# Patient Record
Sex: FEMALE | NOT HISPANIC OR LATINO | Employment: UNEMPLOYED | URBAN - NONMETROPOLITAN AREA
[De-identification: names, ages, dates, MRNs, and addresses within clinical notes are randomized per-mention and may not be internally consistent; named-entity substitution may affect disease eponyms.]

---

## 2024-02-29 ENCOUNTER — PREP FOR PROCEDURE (OUTPATIENT)
Dept: OPERATING ROOM | Facility: HOSPITAL | Age: 64
End: 2024-02-29
Payer: MEDICAID

## 2024-02-29 DIAGNOSIS — H25.812 COMBINED FORMS OF AGE-RELATED CATARACT OF LEFT EYE: Primary | ICD-10-CM

## 2024-02-29 RX ORDER — POVIDONE-IODINE 5 %
SOLUTION, NON-ORAL OPHTHALMIC (EYE) ONCE
Status: CANCELLED | OUTPATIENT
Start: 2024-03-07 | End: 2024-02-29

## 2024-02-29 RX ORDER — TETRACAINE HYDROCHLORIDE 5 MG/ML
1 SOLUTION OPHTHALMIC ONCE
Status: CANCELLED | OUTPATIENT
Start: 2024-03-07 | End: 2024-02-29

## 2024-02-29 RX ORDER — PREDNISOLONE ACETATE 10 MG/ML
1 SUSPENSION/ DROPS OPHTHALMIC ONCE
Status: CANCELLED | OUTPATIENT
Start: 2024-03-07 | End: 2024-02-29

## 2024-02-29 RX ORDER — KETOROLAC TROMETHAMINE 5 MG/ML
1 SOLUTION OPHTHALMIC
Status: CANCELLED | OUTPATIENT
Start: 2024-03-07 | End: 2024-03-07

## 2024-03-05 RX ORDER — ASPIRIN 81 MG/1
81 TABLET ORAL DAILY
COMMUNITY

## 2024-03-05 RX ORDER — TELMISARTAN 40 MG/1
40 TABLET ORAL DAILY
COMMUNITY

## 2024-03-05 RX ORDER — METFORMIN HYDROCHLORIDE EXTENDED-RELEASE TABLETS 500 MG/1
500 TABLET, FILM COATED, EXTENDED RELEASE ORAL
COMMUNITY

## 2024-03-05 RX ORDER — LEVOTHYROXINE SODIUM 75 UG/1
75 TABLET ORAL
COMMUNITY

## 2024-03-05 RX ORDER — ATORVASTATIN CALCIUM 10 MG/1
10 TABLET, FILM COATED ORAL DAILY
COMMUNITY

## 2024-03-06 ENCOUNTER — ANESTHESIA EVENT (OUTPATIENT)
Dept: OPERATING ROOM | Facility: HOSPITAL | Age: 64
End: 2024-03-06
Payer: MEDICAID

## 2024-03-07 ENCOUNTER — ANESTHESIA (OUTPATIENT)
Dept: OPERATING ROOM | Facility: HOSPITAL | Age: 64
End: 2024-03-07
Payer: MEDICAID

## 2024-03-07 ENCOUNTER — HOSPITAL ENCOUNTER (OUTPATIENT)
Facility: HOSPITAL | Age: 64
Setting detail: OUTPATIENT SURGERY
Discharge: HOME | End: 2024-03-07
Attending: OPHTHALMOLOGY | Admitting: OPHTHALMOLOGY
Payer: MEDICAID

## 2024-03-07 VITALS
HEIGHT: 61 IN | TEMPERATURE: 97.9 F | RESPIRATION RATE: 18 BRPM | BODY MASS INDEX: 28.32 KG/M2 | HEART RATE: 64 BPM | DIASTOLIC BLOOD PRESSURE: 75 MMHG | SYSTOLIC BLOOD PRESSURE: 133 MMHG | WEIGHT: 150 LBS | OXYGEN SATURATION: 100 %

## 2024-03-07 LAB — GLUCOSE BLD MANUAL STRIP-MCNC: 107 MG/DL (ref 74–99)

## 2024-03-07 PROCEDURE — 2500000004 HC RX 250 GENERAL PHARMACY W/ HCPCS (ALT 636 FOR OP/ED): Performed by: ANESTHESIOLOGY

## 2024-03-07 PROCEDURE — 7100000010 HC PHASE TWO TIME - EACH INCREMENTAL 1 MINUTE: Performed by: OPHTHALMOLOGY

## 2024-03-07 PROCEDURE — 7100000009 HC PHASE TWO TIME - INITIAL BASE CHARGE: Performed by: OPHTHALMOLOGY

## 2024-03-07 PROCEDURE — 3700000001 HC GENERAL ANESTHESIA TIME - INITIAL BASE CHARGE: Performed by: OPHTHALMOLOGY

## 2024-03-07 PROCEDURE — 82947 ASSAY GLUCOSE BLOOD QUANT: CPT

## 2024-03-07 PROCEDURE — 3600000008 HC OR TIME - EACH INCREMENTAL 1 MINUTE - PROCEDURE LEVEL THREE: Performed by: OPHTHALMOLOGY

## 2024-03-07 PROCEDURE — 2760000001 HC OR 276 NO HCPCS: Performed by: OPHTHALMOLOGY

## 2024-03-07 PROCEDURE — 3700000002 HC GENERAL ANESTHESIA TIME - EACH INCREMENTAL 1 MINUTE: Performed by: OPHTHALMOLOGY

## 2024-03-07 PROCEDURE — 2500000001 HC RX 250 WO HCPCS SELF ADMINISTERED DRUGS (ALT 637 FOR MEDICARE OP): Performed by: OPHTHALMOLOGY

## 2024-03-07 PROCEDURE — 2720000007 HC OR 272 NO HCPCS: Performed by: OPHTHALMOLOGY

## 2024-03-07 PROCEDURE — C1780 LENS, INTRAOCULAR (NEW TECH): HCPCS | Performed by: OPHTHALMOLOGY

## 2024-03-07 PROCEDURE — 3600000003 HC OR TIME - INITIAL BASE CHARGE - PROCEDURE LEVEL THREE: Performed by: OPHTHALMOLOGY

## 2024-03-07 DEVICE — ACRYSOF(R) IQ ASPHERIC NATURAL IOL, SINGLE-PIECE ACRYLIC FOLDABLE PCL, UV WITH BLUE LIGHTFILTER, 13.0MM LENGTH, 6.0MM ANTERIORASYMMETRIC BICONVEX OPTIC, PLANAR HAPTICS.
Type: IMPLANTABLE DEVICE | Site: LENS | Status: FUNCTIONAL
Brand: ACRYSOF®

## 2024-03-07 RX ORDER — POVIDONE-IODINE 5 %
SOLUTION, NON-ORAL OPHTHALMIC (EYE) ONCE
Status: COMPLETED | OUTPATIENT
Start: 2024-03-07 | End: 2024-03-07

## 2024-03-07 RX ORDER — KETOROLAC TROMETHAMINE 5 MG/ML
1 SOLUTION OPHTHALMIC
Status: COMPLETED | OUTPATIENT
Start: 2024-03-07 | End: 2024-03-07

## 2024-03-07 RX ORDER — MIDAZOLAM HYDROCHLORIDE 1 MG/ML
2 INJECTION INTRAMUSCULAR; INTRAVENOUS ONCE AS NEEDED
Status: COMPLETED | OUTPATIENT
Start: 2024-03-07 | End: 2024-03-07

## 2024-03-07 RX ORDER — TETRACAINE HYDROCHLORIDE 5 MG/ML
1 SOLUTION OPHTHALMIC ONCE
Status: COMPLETED | OUTPATIENT
Start: 2024-03-07 | End: 2024-03-07

## 2024-03-07 RX ORDER — SODIUM CHLORIDE, SODIUM LACTATE, POTASSIUM CHLORIDE, CALCIUM CHLORIDE 600; 310; 30; 20 MG/100ML; MG/100ML; MG/100ML; MG/100ML
100 INJECTION, SOLUTION INTRAVENOUS CONTINUOUS
Status: DISCONTINUED | OUTPATIENT
Start: 2024-03-07 | End: 2024-03-07 | Stop reason: HOSPADM

## 2024-03-07 RX ORDER — PROPOFOL 10 MG/ML
INJECTION, EMULSION INTRAVENOUS AS NEEDED
Status: DISCONTINUED | OUTPATIENT
Start: 2024-03-07 | End: 2024-03-07

## 2024-03-07 RX ORDER — MIDAZOLAM HYDROCHLORIDE 1 MG/ML
INJECTION INTRAMUSCULAR; INTRAVENOUS AS NEEDED
Status: DISCONTINUED | OUTPATIENT
Start: 2024-03-07 | End: 2024-03-07

## 2024-03-07 RX ORDER — PREDNISOLONE ACETATE 10 MG/ML
1 SUSPENSION/ DROPS OPHTHALMIC ONCE
Status: DISCONTINUED | OUTPATIENT
Start: 2024-03-07 | End: 2024-03-07 | Stop reason: HOSPADM

## 2024-03-07 RX ADMIN — PROPOFOL 30 MG: 10 INJECTION, EMULSION INTRAVENOUS at 13:01

## 2024-03-07 RX ADMIN — MIDAZOLAM HYDROCHLORIDE 1 MG: 1 INJECTION, SOLUTION INTRAMUSCULAR; INTRAVENOUS at 13:01

## 2024-03-07 RX ADMIN — PHENYLEPHRINE HYDROCHLORIDE 1 DROP: 100 SOLUTION/ DROPS OPHTHALMIC at 12:13

## 2024-03-07 RX ADMIN — KETOROLAC TROMETHAMINE 1 DROP: 5 SOLUTION OPHTHALMIC at 12:13

## 2024-03-07 RX ADMIN — POVIDONE-IODINE: 5 SOLUTION OPHTHALMIC at 11:58

## 2024-03-07 RX ADMIN — MIDAZOLAM HYDROCHLORIDE 2 MG: 1 INJECTION, SOLUTION INTRAMUSCULAR; INTRAVENOUS at 12:38

## 2024-03-07 RX ADMIN — POLYVINYL ALCOHOL, POVIDONE 1 DROP: 14; 6 SOLUTION/ DROPS OPHTHALMIC at 11:59

## 2024-03-07 RX ADMIN — SODIUM CHLORIDE, POTASSIUM CHLORIDE, SODIUM LACTATE AND CALCIUM CHLORIDE 100 ML/HR: 600; 310; 30; 20 INJECTION, SOLUTION INTRAVENOUS at 12:06

## 2024-03-07 RX ADMIN — PHENYLEPHRINE HYDROCHLORIDE 1 DROP: 100 SOLUTION/ DROPS OPHTHALMIC at 12:25

## 2024-03-07 RX ADMIN — KETOROLAC TROMETHAMINE 1 DROP: 5 SOLUTION OPHTHALMIC at 12:05

## 2024-03-07 RX ADMIN — PHENYLEPHRINE HYDROCHLORIDE 1 DROP: 100 SOLUTION/ DROPS OPHTHALMIC at 11:59

## 2024-03-07 RX ADMIN — PHENYLEPHRINE HYDROCHLORIDE 1 DROP: 100 SOLUTION/ DROPS OPHTHALMIC at 12:05

## 2024-03-07 RX ADMIN — KETOROLAC TROMETHAMINE 1 DROP: 5 SOLUTION OPHTHALMIC at 12:25

## 2024-03-07 RX ADMIN — POLYVINYL ALCOHOL, POVIDONE 1 DROP: 14; 6 SOLUTION/ DROPS OPHTHALMIC at 12:05

## 2024-03-07 RX ADMIN — POLYVINYL ALCOHOL, POVIDONE 1 DROP: 14; 6 SOLUTION/ DROPS OPHTHALMIC at 12:26

## 2024-03-07 RX ADMIN — TETRACAINE HYDROCHLORIDE 1 DROP: 5 SOLUTION OPHTHALMIC at 11:58

## 2024-03-07 RX ADMIN — MIDAZOLAM HYDROCHLORIDE 1 MG: 1 INJECTION, SOLUTION INTRAMUSCULAR; INTRAVENOUS at 13:08

## 2024-03-07 RX ADMIN — KETOROLAC TROMETHAMINE 1 DROP: 5 SOLUTION OPHTHALMIC at 11:58

## 2024-03-07 RX ADMIN — POLYVINYL ALCOHOL, POVIDONE 1 DROP: 14; 6 SOLUTION/ DROPS OPHTHALMIC at 12:13

## 2024-03-07 SDOH — HEALTH STABILITY: MENTAL HEALTH: CURRENT SMOKER: 0

## 2024-03-07 ASSESSMENT — PAIN SCALES - GENERAL
PAIN_LEVEL: 3
PAINLEVEL_OUTOF10: 0 - NO PAIN
PAINLEVEL_OUTOF10: 0 - NO PAIN

## 2024-03-07 ASSESSMENT — PAIN - FUNCTIONAL ASSESSMENT
PAIN_FUNCTIONAL_ASSESSMENT: 0-10
PAIN_FUNCTIONAL_ASSESSMENT: 0-10

## 2024-03-07 ASSESSMENT — COLUMBIA-SUICIDE SEVERITY RATING SCALE - C-SSRS
2. HAVE YOU ACTUALLY HAD ANY THOUGHTS OF KILLING YOURSELF?: NO
6. HAVE YOU EVER DONE ANYTHING, STARTED TO DO ANYTHING, OR PREPARED TO DO ANYTHING TO END YOUR LIFE?: NO

## 2024-03-07 NOTE — OP NOTE
Phacoemulsification Cataract with Insertion Intraocular Lens (L) Operative Note     Date: 3/7/2024  OR Location: [facility] OR    Name: Melinda Tenorio, : 1960, Age: 64 y.o., MRN: [id number], Sex: female    Diagnosis  Pre-op Diagnosis     * Combined forms of age-related cataract of left eye [H25.812] Post-op Diagnosis     * Combined forms of age-related cataract of left eye [H25.812]     Procedures  Phacoemulsification Cataract with Insertion Intraocular Lens  84449 - MT XCAPSL CTRC RMVL INSJ IO LENS PROSTH W/O ECP      Surgeons      * Bryson Weber - Primary    Resident/Fellow/Other Assistant:  Surgeon(s) and Role:    Procedure Summary  Anesthesia: Monitor Anesthesia Care  ASA: II  Anesthesia Staff: Anesthesiologist: Byron Smith MD  Estimated Blood Loss: None    Intra-op Medications:   Administrations occurring from 1300 to 1340 on 24:   Medication Name Total Dose   lactated Ringer's infusion Cannot be calculated     Anesthesia Record        Intraprocedure I/O Totals       None      Specimen: No specimens collected     Staff:   Circulator: Zuly Khan RN; Echo Kwan RN  Relief Scrub: Marilyn Barraza RN  Scrub Person: Alena Dick RN; Jw Cheung    Drains and/or Catheters: * None in log *    Implants:  Implants       Type Name Action Serial No.      Lens LENS, INTRAOCULAR, SN60WF 23.0 BRITTNEE - [id number] - [id number] Implanted [id number]        Findings: Combined form age related cataract left eye    Indications: Melinda Tenorio is an 64 y.o. female who is having surgery for Combined forms of age-related cataract of left eye [H25.812]. Patient complains of blurred vision left eye.  Difficulty seeing for reading and watching TV left eye.     The patient was seen in the preoperative area. The risks, benefits, complications, treatment options, non-operative alternatives, expected recovery and outcomes were discussed with the patient. The possibilities of reaction to medication, pulmonary  aspiration, injury to surrounding structures, bleeding, recurrent infection, the need for additional procedures, failure to diagnose a condition, and creating a complication requiring transfusion or operation were discussed with the patient. The patient concurred with the proposed plan, giving informed consent.  The site of surgery was properly noted/marked if necessary per policy. The patient has been actively warmed in preoperative area. Preoperative antibiotics are not indicated. Venous thrombosis prophylaxis are not indicated.    Procedure Details: The patient was correctly identified in the preop area and the operative eye was marked with a marking pen. The operative eye was dilated in the preoperative area.  The patient was then taken to the operating room where timeout was performed before starting the procedure. Combined anesthesia  with intravenous sedation and topical tetracaine eyedrops were given the left eye. The operative eye was prepped and draped in the standard sterile ophthalmic fashion in  preparation for ophthalmic surgery.  A Franny wire speculum was then inserted between the eyelids of the left eye and the operating microscope was placed over the left eye.  A paracentesis incision was made approximately 30° away from the planned surgical incision site with the help of MVR blade.  1% lidocaine MPF with Phenylephrine 1.5% PF was injected into the anterior chamber through the paracentesis incision. A near limbal clear corneal incision was fashioned in the temporal quadrant just outside the vascular arcade and Viscoat was injected into anterior chamber to firm the eye. A bent needle cystotome was used and Utrata forceps were utilized to create a continuous curvilinear capsulorrhexis.  BSS was injected beneath the anterior capsule to hydrodissect the nucleus from adjacent cortex and capsule.  The residual cortex were then aspirated with irrigation aspiration handpiece. The posterior capsule was  then polished with the help of soft irrigation-aspiration tip.  Provisc viscoelastic was then injected into the eye to reform the anterior chamber and to open the capsular bag.  The intraocular lens implant was taken from its sterile wrapping, inspected under the surgical microscope and found to be in good condition. The intraocular lens implant +23.0D was injected into the capsule bag. The Provisc was then aspirated from the anterior chamber and from behind the intraocular lens implant.  The anterior chamber was inflated with the help of BSS to moderate tension.  The edges of the surgical incision were then hydrated with the help of BSS.  Vigamox was then injected into the anterior chamber and into the capsule bag through the paracentesis incision. The surgical wound was then inspected and found to be watertight.  The wire speculum and drapes were then removed.  Pred Forte eyedrops, Acular eyedrops and Betadine 5% sterile ophthalmic solution were instilled in the conjunctival sac.     The patient tolerated the procedure well and was taken to recovery room in stable condition.    Complications:  None; patient tolerated the procedure well.    Disposition:  Home  Condition: stable       Attending Attestation: I performed the procedure.    Bryson Weber  Phone Number: [phone]

## 2024-03-07 NOTE — H&P
H&P Notes  - documented in this encounter  Bryson Weber MD - 03/01/2024 2:47 PM EST  Formatting of this note is different from the original.  HISTORY AND PHYSICAL EXAMINATION    SERVICE DATE: 3/1/2024  SERVICE TIME:    PRIMARY CARE PHYSICIAN: Ramandeep Henry APRN.CNP    REASON FOR VISIT:  Melinda Tenorio is a 64 year old female who is being seen for Combined form age-related cataract left eye    The patient has the following:  ACTIVE PROBLEM LIST  Hyperlipidemia, Mixed  Type 2 Diabetes Mellitus Without Complication, Without Long-Term Current Use of Insulin (Hcc)  Essential Hypertension  Hypothyroidism  Obesity, Class I, Bmi 30-34.9  Combined Forms of Age-Related Cataract of Left Eye  Combined Forms of Age-Related Cataract of Right Eye  Hypercholesteremia    SUBJECTIVE  CHIEF COMPLAINT: Combined form age-related cataract of left eye  HPI: blurred vision for distance and near, difficulty reading small print, watching television, imbalance between Both eyes , glare    PAST MEDICAL HISTORY  Diagnosis Date  Diabetes mellitus (HCC)  Essential hypertension  Hypercholesteremia  Hypothyroidism    History reviewed. No pertinent surgical history.  FAMILY HISTORY  Problem Relation Age of Onset  No Ocular Disease No Family History    SOCIAL HISTORY:  Social History    Tobacco Use  Smoking status: Never  Smokeless tobacco: Never  Vaping Use  Vaping Use: Never used  Substance Use Topics  Alcohol use: Never  Drug use: Never    MEDICATIONS:  Prior to Admission medications as of 3/1/24 1443  Medication Sig Last Dose Taking  metFORMIN (GLUCOPHAGE) 500 mg tablet Take 1 tablet by mouth two times a day. Yes  telmisartan (MICARDIS) 40 mg tablet Take 1 tablet by mouth once daily. Yes  fluorometholone (FML LIQUID FILM) 0.1 % ophthalmic suspension Use 1 Drop in the left eye three times a day. Yes  atorvastatin (LIPITOR) 10 mg tablet Take 1 tablet by mouth once daily. Yes  levothyroxine (SYNTHROID) 75 mcg tablet Take 1 tablet by mouth  daily before breakfast. Yes  ADULT ASPIRIN ORAL Take by mouth. Yes    No medication comments found.    CURRENT ALLERGIES: ALLERGIES  No Known Allergies    REVIEW OF SYSTEMS:  PAIN ASSESSMENT:  General: No weight loss, malaise or fevers.  Neuro: No Hx of stroke or seizures  Respiratory: No history of current cough or dyspnea, or pneumonia in the past 6 weeks. No history of respiratory/pulmonary symptoms or problems  Cardiovascular: Positive for: Hypertension  GI: No history of GI symptoms or problems. No history of esophageal varices, recent ascites, or ETOH greater than 2 drinks per day.  : No history of UTI in past 6 weeks. No history of renal failure. Not currently on or requiring dialysis. No history of  symptoms or problems.  GYN: Negative for abnormal vaginal bleeding, abnormal vaginal discharge.  Pregnancy: Denies  Endocrine: Diabetes Mellitus on oral agent  Hematology: No history of bleeding or clotting disorder. Pt is not taking anti-coagulation or platelet medications. No history of hematological symptoms or problems.  Oncology: No history of CA metastasis, chemo within 30 days, or radiotherapy within 90 days. Has not lost 10% of body wt in 6 months. No history of oncological symptoms or problems.  Psych: No history of psychiatric symptoms or problems.  Musculoskeletal: Negative for joint pain or swelling, back pain or muscle pain.  Skin: Negative for lesions, rash and itching.    PHYSICAL EXAM:  VITALS:  /80  Pulse 57        General: Alert and oriented  Skin: Normal color, no rash, no lesions.  HEENT: EOM, pupils equal, round and reactive.  Cardiovascular: Normal S1 & S2, no rubs, murmurs or gallops. No JVD. Pulse regular.  Lungs: Normal breath sounds, no wheezes or crackles.  Abdomen: Soft, non-tender, no rigidity.  Extremities: No deformity, no edema or tenderness, no joint swelling or clubbing.  Neurological: Normal cognition and motor skills.  Pulses: Carotid and radial pulses normal  +2.    Diagnostic tests reviewed for today's visit:  NA    ASSESSMENT  Medication and Non-Pharmacologic VTE Prophylaxis/Anticoagulants      VTE Prophylaxis: NA    Impression: There is no known pertinent medical condition which may affect robert-operative course    Clinical Risk Factors for Possible Cardiac Complications:  None    Patient is scheduled for a low-risk procedure.    FUNCTIONAL STATUS: Walk indoors, such as around the house (1.75 METs)    Functional Class (NYHA): N/A    [facility]: NA    PLAN  CONSULTS:  Patient does not require consults for optimization at this time.    The Following Tests/Procedures Have Been Initiated:  None    Instructions Given to Patient:  Patient given verbal and written preop instructions and voices comprehension and compliance.    SIGNATURE: Bryson Weber MD PATIENT NAME: Melinda Tenorio  DATE: March 1, 2024 MRN: [id number]  TIME: 2:47 PM PAGER/CONTACT #:    Electronically signed by Bryson Weber MD at 03/01/2024 3:08 PM EST   Source Comments  - [facility]  In the event this information is protected by the Federal Confidentiality of Alcohol and Drug Abuse Patient Records regulations: This information has been disclosed to you from records protected by Federal confidentiality rules (42 CFR Part 2). The Federal rules prohibit you from making any further disclosure of this information unless further disclosure is expressly permitted by the written consent of the person to whom it pertains or as otherwise permitted by 42 CFR Part 2. A general authorization for the release of medical or other information is NOT sufficient for this purpose. The Federal rules restrict any use of the information to criminally investigate or prosecute any alcohol or drug abuse patient.  Reason for Visit    Reason for Visit -  Reason Comments   Blurred Vision Both Eyes     Difficulty Reading Both Eyes     Glare       Encounter Details    Encounter Details  Date Type Department Care Team (Latest  Contact Info) Description   03/01/2024 1:45 PM EST Office Visit [facility] Ophthalmology   [address] [place] [address]   [phone]  Bryson Weber MD   [address] [place] [id number]   [phone] (Work)   [phone] (Fax)  Combined forms of age-related cataract of left eye (Primary Dx);  Combined forms of age-related cataract of right eye;  Type 2 diabetes mellitus without complication, without long-term current use of insulin (HCC);  Essential hypertension;  Hypercholesteremia     Social History  - documented as of this encounter  Social History  Tobacco Use Types Packs/Day Years Used Date   Smoking Tobacco: Never           Smokeless Tobacco: Never             Social History  Alcohol Use Standard Drinks/Week Comments   Never 0 (1 standard drink = 0.6 oz pure alcohol)       Social History  Area Deprivation Index Answer Date Recorded   National Score (1-100), lower number is lower risk 50 02/22/2024   State Score (1-10), lower number is lower risk 3 02/22/2024   Data from: [email or web address]/. Last address used for calculation [address] [place] [address] 02/22/2024     Social History  Sex and Gender Information Value Date Recorded   Sex Assigned at Birth Not on file     Gender Identity Not on file     Sexual Orientation Not on file       Last Filed Vital Signs  - documented in this encounter  Last Filed Vital Signs  Vital Sign Reading Time Taken Comments   Blood Pressure 132/80 03/01/2024 2:38 PM EST     Pulse 57 03/01/2024 2:38 PM EST     Temperature - -     Respiratory Rate - -     Oxygen Saturation - -     Inhaled Oxygen Concentration - -     Weight - -     Height - -     Body Mass Index - -       Patient Instructions  - documented in this encounter  Patient Instructions  Bryson Weber MD - 03/01/2024 2:40 PM EST   Formatting of this note is different from the original.  Continue:  Current Ophthalmic Meds        fluorometholone (FML LIQUID FILM) 0.1 % ophthalmic suspension  Use 1 Drop in the left eye three times a day.        Systane Complete Artificial Tears - Use 1 Drop into both eyes three times a day.    If you have any questions please contact our office at [phone].  After office hours or on the weekend, please call Dr. Weber on his cell phone at [phone].    Electronically signed by Bryson Weber MD at 03/01/2024 2:40 PM EST     Progress Notes  - documented in this encounter  Bryson Weber MD - 03/01/2024 2:45 PM EST  Formatting of this note is different from the original.  ASSESSMENT/PLAN:  1. Combined forms of age-related cataract of left eye - ICD9: 366.19, ICD10: H25.812 (primary diagnosis)    Cataract Presurgical Documentation    Cataract: Left Eye    Current Visual Acuity  Right Eye Distance CC 20/25  Left Eye Distance CC 20/50    Visual Function: Melinda Tenorio states that the decline in vision from the cataract impedes her abilities as listed in the HPI, as well as other activities of daily living.    Melinda Tenorio has confirmed that she is no longer able to function adequately on a day-to-day basis because of her current visual condition.    Further, it is my medical opinion that the cataract is the primary cause, or at least a significantly contributory cause of her visual dysfunction. With uncomplicated cataract surgery and lens implantation, it is my expectation that her visual function and quality of life will improve, significantly.    The risks, benefits, alternatives, personnel and complications of cataract surgery with lens implantation were discussed with Melinda Tenorio in detail. She appeared to understand and asked that I proceed with plans for surgery.    Upon eye examination, patient was found to have a visually significant cataract Left Eye. Discussed cataract surgery with patient and different intraocular lens implant options with patient: basic monofocal intraocular lens implant, Toric intraocular lens implant, and presbyopia  correction intraocular lens implant. In my medical opinion, based on medical history and ocular examination, cataract surgery with intraocular lens implant will correct patient's vision and improve quality of patient's daily living activities. Patient wishes to have traditional cataract surgery with basic intraocular lens Left Eye on March 7, 2024 at [facility]. Patient wishes to have cataract surgery with the option stated above. Patient understands that an intraocular lens implant does not necessarily replace the need for glasses. Patient understands that it is impossible for the surgeon to inform him/her of every possible complication that may occur. The surgeon has answered all of the patient's questions. Patient understands that if he/she has a mature or dense cataract, pseudoexfoliation cataract, or history of use of Flomax, he/she may require the use of Maluyugin Ring and/or Vision Blue during surgery. Patient understands the risks, benefits, and alternatives to surgery.    Plan Cataract Surgery with Monofocal Intraocular Lens Implant Left Eye on 03/07/2024 at [facility].    Current Ophthalmic Meds        fluorometholone (FML LIQUID FILM) 0.1 % ophthalmic suspension Use 1 Drop in the left eye three times a day.    Start:  Systane Complete solution instill 1 drop 3 times daily Both Eyes.    PHYSICAL EXAM:    Vital Signs:  Blood pressure 132/80, pulse (!) 57.    Respiratory:  Normal breath sounds, no wheezing.    CARD:  Normal heart sounds 1 & 2, normal sinus rhythm.    2. Combined forms of age-related cataract of right eye - ICD9: 366.19, ICD10: H25.811    -Not visually significant Right Eye.    3. Type 2 diabetes mellitus without complication, without long-term current use of insulin (HCC) - ICD9: 250.00, ICD10: E11.9    Please keep your blood sugar under good control to minimize risk of ocular complications from diabetes.    Continue to  monitor with primary care physician.    4. Essential hypertension - ICD9: 401.9, ICD10: I10    Continue to monitor with primary care physician.    5. Hypercholesteremia - ICD9: 272.0, ICD10: E78.00    Continue to monitor with primary care physician.    6. Hypothyroidism, unspecified type - ICD9: 244.9, ICD10: E03.9    Continue to monitor with primary care physician.    I have confirmed and edited as necessary the relevant HPI, ophthalmic history, ROS, and the neuro exam findings as obtained by others. I have seen and examined Melinda Tenorio.    I have discussed the case and the management of this patient's care with the Resident/Fellow, if applicable. I also have reviewed and agree with the assessment and plan as stated above and agree with all of its relevant components.        Electronically signed by Bryson Weber MD at 03/01/2024 3:08 PM EST   Plan of Treatment  - documented as of this encounter  Plan of Treatment - Upcoming Encounters  Upcoming Encounters  Date Type Department Care Team (Latest Contact Info) Description   03/08/2024 2:15 PM EST Office Visit [facility] Ophthalmology   [address] [place] [address]   [phone]  Bryson Weber MD   [address] [place] [address]   [phone] (Work)   [phone] (Fax)  1 DAY CAT PO 1ST LE   03/18/2024 1:40 PM EDT Appointment Radiology   [address] [place] [id number] [phone]    Encounter for screening for osteoporosis [Z13.820]     Plan of Treatment - Scheduled Procedures  Scheduled Procedures  Name Priority Associated Diagnoses Date/Time   SURGERY AT NON-[facility] FACILITY   Combined form of age-related cataract, left eye  03/07/2024 10:45 AM EST     Procedures  - documented in this encounter  Procedures  Procedure Name Priority Date/Time Associated Diagnosis Comments   ASCAN ONLY - DIAGNOSTIC OS (LEFT EYE) Routine 03/01/2024 2:43 PM EST Combined forms of age-related cataract of left eye  Results for this procedure are in the  results section.      Imaging Results  - documented in this encounter  ASCAN ONLY - DIAGNOSTIC OS (LEFT EYE) (03/01/2024 2:43 PM EST)  Imaging Results - ASCAN ONLY - DIAGNOSTIC OS (LEFT EYE) (03/01/2024 2:43 PM EST)  Anatomical Region Laterality Modality       Other     Imaging Results - ASCAN ONLY - DIAGNOSTIC OS (LEFT EYE) (03/01/2024 2:43 PM EST)  Narrative   03/01/2024 2:43 PM EST   Date of Procedure  3/1/2024.    Technician Information  Imaging Technician: martín Greer  LENSTAR  Right eye:  AL 22.69 ACD 2.50 WTW 11.89  Left eye:     AL 22.51 ACD 2.64 WTW 11.78        Imaging Results - ASCAN ONLY - DIAGNOSTIC OS (LEFT EYE) (03/01/2024 2:43 PM EST)  Authorizing Provider Result Type   Bryson Weber MD OPHTHALMOLOGY     Associated Images       3/1/2024  ASCAN ONLY - DIAGNOSTIC OS (LEFT EYE)     Visit Diagnoses  - documented in this encounter  Visit Diagnoses  Diagnosis   Combined forms of age-related cataract of left eye - Primary   Other and combined forms of senile cataract    Combined forms of age-related cataract of right eye   Other and combined forms of senile cataract    Type 2 diabetes mellitus without complication, without long-term current use of insulin (HCC)    Essential hypertension   Unspecified essential hypertension    Hypercholesteremia   Pure hypercholesterolemia      Eye Exam    Eye Exam - Visual Acuity (Snellen - Linear)  Visual Acuity (Snellen - Linear)    Right eye Left eye   Dist cc 20/25 20/50   Near cc J2 J8     Eye Exam  Correction: Glasses     Eye Exam - Tonometry (Applanation, 2:44 PM)  Tonometry (Applanation, 2:44 PM)    Right eye Left eye   Pressure 15 15     Eye Exam - Pupils  Pupils    Pupils Dark Light Shape APD   Right eye PERRL 4 2 Round None   Left eye PERRL 4 2 Round None     Eye Exam - Visual Fields (Counting fingers)  Visual Fields (Counting fingers)    Right eye Left eye     Full Full     Eye Exam - Extraocular Movement  Extraocular Movement    Right eye Left eye      Full Full     Eye Exam - Neuro/Psych  Neuro/Psych  Oriented x3: Yes   Mood/Affect: Normal      LENSTAR  Right eye: AL 22.69 ACD 2.50 WTW 11.89  Left eye: AL 22.51 ACD 2.64 WTW 11.78       Eye Exam - External Exam  External Exam    Right eye Left eye   External Normal including orbits and preauricular lymph nodes Normal including orbits and preauricular lymph nodes     Eye Exam - Slit Lamp Exam  Slit Lamp Exam    Right eye Left eye   Lids/Lashes Normal lids, lashes, lacrimal glands, and lacrimal drainage Normal lids, lashes, lacrimal glands, and lacrimal drainage   Conjunctiva/Sclera White and quiet White and quiet   Cornea Normal epithelium, stroma, endothelium, and tear film Normal epithelium, stroma, endothelium, and tear film   Anterior Chamber Deep and quiet Deep and quiet   Iris Round and reactive Round and reactive   Lens 2+ Nuclear sclerotic cataract, 2+ Posterior subcapsular cataract 3+ Nuclear sclerotic cataract, 3+ Posterior subcapsular cataract   Anterior Vitreous Normal Normal     Eye Exam - Fundus Exam  Fundus Exam    Right eye Left eye   Disc Normal Normal   C/D Ratio 0.3 0.3   Macula Normal Blunt foveal reflex   Vessels Normal Normal   Periphery Normal Normal     Eye Exam - Wearing Rx  Wearing Rx    Sphere Cylinder Axis Add   Right eye +1.50 +1.00 143 +2.00   Left eye +1.50 +1.00 014 +2.00

## 2024-03-07 NOTE — ANESTHESIA POSTPROCEDURE EVALUATION
Patient: Melinda Tenorio    Procedure Summary       Date: 03/07/24 Room / Location: [facility] OR 05 / Virtual [facility] OR    Anesthesia Start: 1258 Anesthesia Stop: 1330    Procedure: Phacoemulsification Cataract with Insertion Intraocular Lens (Left: Eye) Diagnosis:       Combined forms of age-related cataract of left eye      (Combined forms of age-related cataract of left eye [H25.812])    Surgeons: Bryson Weber MD Responsible Provider: Byron Smith MD    Anesthesia Type: MAC ASA Status: 2            Anesthesia Type: MAC    Vitals Value Taken Time   /75 03/07/24 1327   Temp 36.6 °C (97.9 °F) 03/07/24 1327   Pulse 64 03/07/24 1327   Resp 18 03/07/24 1327   SpO2 100 % 03/07/24 1327       Anesthesia Post Evaluation    Patient location during evaluation: PACU  Patient participation: complete - patient participated  Level of consciousness: awake and alert  Pain score: 3  Pain management: adequate  Multimodal analgesia pain management approach  Airway patency: patent  Cardiovascular status: acceptable  Respiratory status: acceptable  Hydration status: acceptable  Postoperative Nausea and Vomiting: none        There were no known notable events for this encounter.

## 2024-03-07 NOTE — DISCHARGE INSTRUCTIONS
Please see enclosed instructions from Dr. Weber regarding eye drop schedule, restrictions and use of eye shield.    Please take bag with eye drops that were given to you today as well as ALL eye drops that you are using at home with you to your appointment tomorrow at Dr. Weber's office.     Please see enclosed sedation information

## 2024-03-07 NOTE — ANESTHESIA PREPROCEDURE EVALUATION
Patient: Melinda Tenorio    Procedure Information       Date/Time: 03/07/24 1300    Procedure: Phacoemulsification Cataract with Insertion Intraocular Lens (Left: Eye)    Location: [facility] OR 05 / [facility] OR    Surgeons: Bryson Weber MD            Relevant Problems   Anesthesia (within normal limits)   (-) PONV (postoperative nausea and vomiting)      Cardiovascular (within normal limits)      Endocrine (within normal limits)      GI (within normal limits)      /Renal (within normal limits)      Neuro/Psych (within normal limits)      Pulmonary (within normal limits)      GI/Hepatic (within normal limits)      Hematology (within normal limits)      Musculoskeletal (within normal limits)      Eyes, Ears, Nose, and Throat (within normal limits)      Infectious Disease (within normal limits)       Clinical information reviewed:   Tobacco  Allergies  Meds   Med Hx  Surg Hx   Fam Hx  Soc Hx        NPO Detail:  NPO/Void Status  Carbohydrate Drink Given Prior to Surgery? : N  Date of Last Liquid: 03/07/24  Time of Last Liquid: 1000  Date of Last Solid: 03/05/24  Time of Last Solid: 1900  Last Intake Type: Clear fluids  Time of Last Void: 1200         Physical Exam    Airway  Mallampati: II  TM distance: >3 FB  Neck ROM: full     Cardiovascular   Rhythm: regular  Rate: normal     Dental - normal exam     Pulmonary   Breath sounds clear to auscultation     Abdominal            Anesthesia Plan    History of general anesthesia?: yes  History of complications of general anesthesia?: no    ASA 2     MAC     The patient is not a current smoker.  Patient was not previously instructed to abstain from smoking on day of procedure.  Patient did not smoke on day of procedure.    Anesthetic plan and risks discussed with patient.

## (undated) DEVICE — Device